# Patient Record
Sex: FEMALE | URBAN - METROPOLITAN AREA
[De-identification: names, ages, dates, MRNs, and addresses within clinical notes are randomized per-mention and may not be internally consistent; named-entity substitution may affect disease eponyms.]

---

## 2024-08-14 ENCOUNTER — OFFICE VISIT (OUTPATIENT)
Dept: URBAN - METROPOLITAN AREA CLINIC 115 | Facility: CLINIC | Age: 49
End: 2024-08-14

## 2024-08-14 NOTE — HPI-TODAY'S VISIT:
Pt is a 48 y/o F with PMH of HIV, MICHELLE in clinic on referral from Dr. Juan Brush for screening colonoscopy. A copy of this note will be sent to referring provider.  Denies PMH of MI, stroke, asthma, COPD, unstable angina, pacemaker, defibrillator, use of blood thinners/ASA, and family hx of CRC. Had pos FOBT 2023. Denies abdominal pain, n/v, diarrhea, constipation, blood in stool/vomit, dysphagia, odynophagia, unintentional weight loss, change in appetite, early satiety. Denies NSAID use, EtOH/tobacco/marijuana use.  Last colonoscopy: